# Patient Record
Sex: MALE | Race: WHITE | HISPANIC OR LATINO | Employment: FULL TIME | URBAN - METROPOLITAN AREA
[De-identification: names, ages, dates, MRNs, and addresses within clinical notes are randomized per-mention and may not be internally consistent; named-entity substitution may affect disease eponyms.]

---

## 2023-04-18 ENCOUNTER — HOSPITAL ENCOUNTER (EMERGENCY)
Facility: HOSPITAL | Age: 63
Discharge: HOME | End: 2023-04-18
Attending: EMERGENCY MEDICINE

## 2023-04-18 VITALS
HEART RATE: 66 BPM | SYSTOLIC BLOOD PRESSURE: 152 MMHG | DIASTOLIC BLOOD PRESSURE: 97 MMHG | HEIGHT: 72 IN | RESPIRATION RATE: 18 BRPM | WEIGHT: 200 LBS | TEMPERATURE: 98 F | BODY MASS INDEX: 27.09 KG/M2 | OXYGEN SATURATION: 100 %

## 2023-04-18 DIAGNOSIS — M54.50 ACUTE EXACERBATION OF CHRONIC LOW BACK PAIN: Primary | ICD-10-CM

## 2023-04-18 DIAGNOSIS — G89.29 ACUTE EXACERBATION OF CHRONIC LOW BACK PAIN: Primary | ICD-10-CM

## 2023-04-18 RX ORDER — KETOROLAC TROMETHAMINE 30 MG/ML
15 INJECTION, SOLUTION INTRAMUSCULAR; INTRAVENOUS ONCE
Status: COMPLETED | OUTPATIENT
Start: 2023-04-18 | End: 2023-04-18

## 2023-04-18 RX ORDER — CYCLOBENZAPRINE HCL 10 MG
10 TABLET ORAL 2 TIMES DAILY PRN
Qty: 20 TABLET | Refills: 0 | Status: SHIPPED | OUTPATIENT
Start: 2023-04-18

## 2023-04-18 RX ORDER — LIDOCAINE 50 MG/G
2 PATCH TOPICAL ONCE
Status: DISCONTINUED | OUTPATIENT
Start: 2023-04-18 | End: 2023-04-18 | Stop reason: HOSPADM

## 2023-04-18 RX ORDER — CYCLOBENZAPRINE HCL 10 MG
10 TABLET ORAL 2 TIMES DAILY PRN
Qty: 20 TABLET | Refills: 0 | Status: SHIPPED | OUTPATIENT
Start: 2023-04-18 | End: 2023-04-18 | Stop reason: CLARIF

## 2023-04-18 RX ADMIN — LIDOCAINE 2 PATCH: 50 PATCH TOPICAL at 16:11

## 2023-04-18 RX ADMIN — KETOROLAC TROMETHAMINE 15 MG: 30 INJECTION, SOLUTION INTRAMUSCULAR; INTRAVENOUS at 16:11

## 2023-04-18 NOTE — DISCHARGE INSTRUCTIONS
Take 650mg of acetaminophen (Tylenol) with 400 mg of ibuprofen (Advil) every 6-8 hours as needed for pain  Lidocaine patches are available over-the-counter can be found in the back pain aisle of most pharmacies  Look for 4% lidocaine in the list of the active ingredients  These patches can be placed for 12 hours  After 12 hours, discard the patch  The next patch can be placed 12 hours later  You may take Flexeril as needed  Advised it may cause drowsiness so do not drive or operate heavy machinery after use  Follow-up with orthopedics and the comprehensive spine program for further evaluation and management  Return to the ED if you develop any worsening symptoms as discussed prior to discharge

## 2023-04-18 NOTE — ED PROVIDER NOTES
History  Chief Complaint   Patient presents with   • Back Pain     Patient presents to the ER with worsening chronic back pain  This is a 65 YOM who presents to the ED for evaluation of low back pain  Patient reports this has been a chronic issue for over 20 years that will wax and wane in severity  Has tried PT and cortisone injections in the past   He denies any recent trauma or injury states that over the last few weeks his pain has been worsening  He reports bilateral lower back pain that radiates into both thighs, states these are not new symptoms  He denies any recent fevers, chills, headache, chest pain, SOB, abdominal pain, NVD, loss of bowel or bladder function, loss of sensation in either leg  He reports taking aleve at home for the pain, last took aleve yesterday evening  He reports that he is interested in receiving a cortisone injection if possible, or outpatient referral for possible cortisone injection  He is able to walk with a steady gait in the ED  None       Past Medical History:   Diagnosis Date   • Back pain at L4-L5 level        History reviewed  No pertinent surgical history  History reviewed  No pertinent family history  I have reviewed and agree with the history as documented  E-Cigarette/Vaping   • E-Cigarette Use Never User      E-Cigarette/Vaping Substances     Social History     Tobacco Use   • Smoking status: Never   • Smokeless tobacco: Never   Vaping Use   • Vaping Use: Never used   Substance Use Topics   • Alcohol use: Not Currently   • Drug use: Never       Review of Systems   Constitutional: Negative for chills and fever  Respiratory: Negative for shortness of breath  Cardiovascular: Negative for chest pain and palpitations  Gastrointestinal: Negative for abdominal pain, nausea and vomiting  Genitourinary: Negative for difficulty urinating and dysuria  Musculoskeletal: Positive for back pain   Negative for gait problem, neck pain and neck stiffness  Neurological: Negative for dizziness, weakness, light-headedness and headaches  Physical Exam  Physical Exam  Vitals and nursing note reviewed  Constitutional:       General: He is not in acute distress  Appearance: He is well-developed  HENT:      Head: Normocephalic and atraumatic  Eyes:      Conjunctiva/sclera: Conjunctivae normal    Cardiovascular:      Rate and Rhythm: Normal rate and regular rhythm  Heart sounds: No murmur heard  Pulmonary:      Effort: Pulmonary effort is normal  No respiratory distress  Breath sounds: Normal breath sounds  Abdominal:      Palpations: Abdomen is soft  Tenderness: There is no abdominal tenderness  Musculoskeletal:         General: No swelling  Cervical back: Neck supple  No swelling, deformity or tenderness  Normal range of motion  Thoracic back: No swelling, deformity or tenderness  Normal range of motion  Lumbar back: No swelling, deformity or tenderness  Comments: Examination of patient's back shows no signs of cervical, thoracic, lumbar midline tenderness or step-off deformities  Patient's lower back is nontender on palpation  Patient's leg strength 5/5 and equal bilaterally with normal ROM throughout legs  Proximal and distal sensation intact in lower extremities  Patient able to walk with steady gait in ED  Skin:     General: Skin is warm and dry  Capillary Refill: Capillary refill takes less than 2 seconds  Neurological:      General: No focal deficit present  Mental Status: He is alert and oriented to person, place, and time     Psychiatric:         Mood and Affect: Mood normal          Vital Signs  ED Triage Vitals [04/18/23 1516]   Temperature Pulse Respirations Blood Pressure SpO2   98 °F (36 7 °C) 66 18 152/97 100 %      Temp Source Heart Rate Source Patient Position - Orthostatic VS BP Location FiO2 (%)   Oral -- Sitting Left arm --      Pain Score       10 - Worst Possible Pain Vitals:    04/18/23 1516   BP: 152/97   Pulse: 66   Patient Position - Orthostatic VS: Sitting         Visual Acuity      ED Medications  Medications   lidocaine (LIDODERM) 5 % patch 2 patch (2 patches Topical Medication Applied 4/18/23 1611)   ketorolac (TORADOL) injection 15 mg (15 mg Intramuscular Given 4/18/23 1611)       Diagnostic Studies  Results Reviewed     None                 No orders to display              Procedures  Procedures         ED Course                               SBIRT 20yo+    Flowsheet Row Most Recent Value   Initial Alcohol Screen: US AUDIT-C     1  How often do you have a drink containing alcohol? 0 Filed at: 04/18/2023 1609   2  How many drinks containing alcohol do you have on a typical day you are drinking? 0 Filed at: 04/18/2023 1609   3a  Male UNDER 65: How often do you have five or more drinks on one occasion? 0 Filed at: 04/18/2023 1609   3b  FEMALE Any Age, or MALE 65+: How often do you have 4 or more drinks on one occassion? 0 Filed at: 04/18/2023 1609   Audit-C Score 0 Filed at: 04/18/2023 1609   MAYRA: How many times in the past year have you    Used an illegal drug or used a prescription medication for non-medical reasons? Never Filed at: 04/18/2023 1609                    Medical Decision Making  65 YOM with chronic back pain for greater than 20 years presents to the ED for evaluation of lower back pain radiating into both thighs, reports these are not new symptoms  Denies fevers at home, IV drug use, loss of bowel or bladder function, loss of sensation in either leg  Able to walk with steady gait in ED  Given IM toradol, lidocaine patches in ED  Flexeril prescription sent to pharmacy  Given ambulatory referral to comprehensive spine program for further evaluation management  Patient reports that he was hoping to receive a cortisone injection in his lower back and is asking for information for orthopedics    Also given local referral information for orthopedics in discharge paperwork  Discussed ED return precautions with patient  Patient verbalized understanding and agreement with plan  Risk  Prescription drug management  Disposition  Final diagnoses:   Acute exacerbation of chronic low back pain     Time reflects when diagnosis was documented in both MDM as applicable and the Disposition within this note     Time User Action Codes Description Comment    4/18/2023  3:52 PM Karalee Gravel Add [M54 50] Low back pain     4/18/2023  3:53 PM Warren Burrs [M54 42,  M54 41] Low back pain with bilateral sciatica     4/18/2023  3:53 PM Jamieelylashonda Laws [X60 59,  M54 41] Low back pain with bilateral sciatica     4/18/2023  3:53 PM Karalee Gravel Remove [M54 50] Low back pain     4/18/2023  3:53 PM Jennifer Gramajoman [Y36 40,  M54 41] Low back pain with bilateral sciatica     4/18/2023  3:53 PM Karalee Gravel Add [M54 50,  G89 29] Acute exacerbation of chronic low back pain       ED Disposition     ED Disposition   Discharge    Condition   Stable    Date/Time   Tue Apr 18, 2023  3:56 PM    Comment   Creed Ghee discharge to home/self care  Follow-up Information     Follow up With Specialties Details Why Contact Info Additional [phone] [facility] Orthopedic Surgery Schedule an appointment as soon as possible for a visit  For re-check [phone] [facility] [phone]  [address] [place] [address] Specialists [facility], [address] [place] [address]          Patient's Medications   Discharge Prescriptions    CYCLOBENZAPRINE (FLEXERIL) 10 MG TABLET    Take 1 tablet (10 mg total) by mouth 2 (two) times a day as needed for muscle spasms       Start Date: 4/18/2023 End Date: --       Order Dose: 10 mg       Quantity: 20 tablet    Refills: 0       No discharge procedures on file      PDMP Review     None          ED Provider  Electronically Signed by           Yenifer Enciso PA-C  04/18/23 1163

## 2023-04-29 ENCOUNTER — HOSPITAL ENCOUNTER (EMERGENCY)
Facility: HOSPITAL | Age: 63
Discharge: HOME | End: 2023-04-29
Attending: EMERGENCY MEDICINE

## 2023-04-29 ENCOUNTER — APPOINTMENT (EMERGENCY)
Dept: RADIOLOGY | Facility: HOSPITAL | Age: 63
End: 2023-04-29

## 2023-04-29 ENCOUNTER — APPOINTMENT (EMERGENCY)
Dept: CT IMAGING | Facility: HOSPITAL | Age: 63
End: 2023-04-29

## 2023-04-29 VITALS
DIASTOLIC BLOOD PRESSURE: 80 MMHG | OXYGEN SATURATION: 97 % | TEMPERATURE: 97.8 F | SYSTOLIC BLOOD PRESSURE: 114 MMHG | HEART RATE: 83 BPM | RESPIRATION RATE: 16 BRPM

## 2023-04-29 DIAGNOSIS — T88.7XXA MEDICATION SIDE EFFECT: ICD-10-CM

## 2023-04-29 DIAGNOSIS — M54.9 BACK PAIN: ICD-10-CM

## 2023-04-29 DIAGNOSIS — R42 LIGHTHEADEDNESS: Primary | ICD-10-CM

## 2023-04-29 LAB
ALBUMIN SERPL BCP-MCNC: 4.3 G/DL (ref 3.5–5)
ALP SERPL-CCNC: 53 U/L (ref 34–104)
ALT SERPL W P-5'-P-CCNC: 39 U/L (ref 7–52)
ANION GAP SERPL CALCULATED.3IONS-SCNC: 5 MMOL/L (ref 4–13)
AST SERPL W P-5'-P-CCNC: 25 U/L (ref 13–39)
ATRIAL RATE: 95 BPM
BASOPHILS # BLD AUTO: 0.04 THOUSANDS/ÂΜL (ref 0–0.1)
BASOPHILS NFR BLD AUTO: 1 % (ref 0–1)
BILIRUB SERPL-MCNC: 1.99 MG/DL (ref 0.2–1)
BUN SERPL-MCNC: 17 MG/DL (ref 5–25)
CALCIUM SERPL-MCNC: 9.2 MG/DL (ref 8.4–10.2)
CARDIAC TROPONIN I PNL SERPL HS: 2 NG/L
CHLORIDE SERPL-SCNC: 102 MMOL/L (ref 96–108)
CO2 SERPL-SCNC: 29 MMOL/L (ref 21–32)
CREAT SERPL-MCNC: 0.87 MG/DL (ref 0.6–1.3)
EOSINOPHIL # BLD AUTO: 0.29 THOUSAND/ÂΜL (ref 0–0.61)
EOSINOPHIL NFR BLD AUTO: 4 % (ref 0–6)
ERYTHROCYTE [DISTWIDTH] IN BLOOD BY AUTOMATED COUNT: 12.7 % (ref 11.6–15.1)
GFR SERPL CREATININE-BSD FRML MDRD: 93 ML/MIN/1.73SQ M
GLUCOSE SERPL-MCNC: 93 MG/DL (ref 65–140)
HCT VFR BLD AUTO: 50 % (ref 36.5–49.3)
HGB BLD-MCNC: 15.8 G/DL (ref 12–17)
IMM GRANULOCYTES # BLD AUTO: 0.01 THOUSAND/UL (ref 0–0.2)
IMM GRANULOCYTES NFR BLD AUTO: 0 % (ref 0–2)
LYMPHOCYTES # BLD AUTO: 1.98 THOUSANDS/ÂΜL (ref 0.6–4.47)
LYMPHOCYTES NFR BLD AUTO: 27 % (ref 14–44)
MCH RBC QN AUTO: 30.5 PG (ref 26.8–34.3)
MCHC RBC AUTO-ENTMCNC: 31.6 G/DL (ref 31.4–37.4)
MCV RBC AUTO: 97 FL (ref 82–98)
MONOCYTES # BLD AUTO: 0.53 THOUSAND/ÂΜL (ref 0.17–1.22)
MONOCYTES NFR BLD AUTO: 7 % (ref 4–12)
NEUTROPHILS # BLD AUTO: 4.49 THOUSANDS/ÂΜL (ref 1.85–7.62)
NEUTS SEG NFR BLD AUTO: 61 % (ref 43–75)
NRBC BLD AUTO-RTO: 0 /100 WBCS
P AXIS: 66 DEGREES
PLATELET # BLD AUTO: 165 THOUSANDS/UL (ref 149–390)
PMV BLD AUTO: 10.8 FL (ref 8.9–12.7)
POTASSIUM SERPL-SCNC: 4.9 MMOL/L (ref 3.5–5.3)
PR INTERVAL: 134 MS
PROT SERPL-MCNC: 7.3 G/DL (ref 6.4–8.4)
QRS AXIS: 59 DEGREES
QRSD INTERVAL: 88 MS
QT INTERVAL: 334 MS
QTC INTERVAL: 419 MS
RBC # BLD AUTO: 5.18 MILLION/UL (ref 3.88–5.62)
SODIUM SERPL-SCNC: 136 MMOL/L (ref 135–147)
T WAVE AXIS: 57 DEGREES
VENTRICULAR RATE: 95 BPM
WBC # BLD AUTO: 7.34 THOUSAND/UL (ref 4.31–10.16)

## 2023-04-29 RX ORDER — SODIUM CHLORIDE 9 MG/ML
3 INJECTION INTRAVENOUS
Status: DISCONTINUED | OUTPATIENT
Start: 2023-04-29 | End: 2023-04-29 | Stop reason: HOSPADM

## 2023-04-29 RX ORDER — IBUPROFEN 800 MG/1
800 TABLET ORAL 3 TIMES DAILY
Qty: 9 TABLET | Refills: 0 | Status: SHIPPED | OUTPATIENT
Start: 2023-04-29 | End: 2023-05-02

## 2023-04-29 RX ADMIN — SODIUM CHLORIDE 1000 ML: 0.9 INJECTION, SOLUTION INTRAVENOUS at 12:47

## 2023-04-29 NOTE — DISCHARGE INSTRUCTIONS
-Please take gabapentin 300 mg once today and once tomorrow  Afterwards, please stop the gabapentin  I believe that your lightheadedness is likely secondary to a high gabapentin dose  Please follow-up with your pain management doctor for further work-up and management    You may use ibuprofen 8 or milligrams and Tylenol 1000 mg 3 times a day for the next 3 days to help with pain along with Flexeril twice a day as needed for muscle spasms

## 2023-04-29 NOTE — ED NOTES
Pt ambulates in hallway per MD request, no complaints, denies dizziness  MD aware        Clyde Soriano, RN  04/29/23 0515

## 2023-05-04 NOTE — ED PROVIDER NOTES
"History  Chief Complaint   Patient presents with    Dizziness     \"I FEEL tingling in both my arms and dizziness and like im going to pass out so I want you to tell me I have low bp\" denies any CP, SOB \"last week I was treated for pinched nerve in my back\" started a little yesterday taking flexeril TID, gabapentin TID and medrol pack     Patient is a 29-year-old male who presents for evaluation of lightheadedness  He has chronic back pain for which she was seen 11 days ago  He was started on Flexeril and went to a pain management doctor that also started him on a Medrol Dosepak and gabapentin  He is on gabapentin 4 mg 3 times daily at this point  He says since taking the gabapentin he feels like he has some orthostasis/lightheadedness upon standing  He has not actually lost consciousness  He also does feel some mild fatigue  He denies weakness numbness at this time  Denies any headache nausea vomiting chest pain or dyspnea  No abdominal pain  Lumbar back pain is chronic moderate  Worsened by certain positions and by movement  He denies red flags including bowel bladder incontinence saddle anesthesia weakness or numbness in the legs  Prior to Admission Medications   Prescriptions Last Dose Informant Patient Reported? Taking? cyclobenzaprine (FLEXERIL) 10 mg tablet   No No   Sig: Take 1 tablet (10 mg total) by mouth 2 (two) times a day as needed for muscle spasms      Facility-Administered Medications: None       Past Medical History:   Diagnosis Date    Back pain at L4-L5 level        History reviewed  No pertinent surgical history  History reviewed  No pertinent family history  I have reviewed and agree with the history as documented      E-Cigarette/Vaping    E-Cigarette Use Never User      E-Cigarette/Vaping Substances     Social History     Tobacco Use    Smoking status: Never    Smokeless tobacco: Never   Vaping Use    Vaping Use: Never used   Substance Use Topics    Alcohol use: " Not Currently    Drug use: Never       Review of Systems   Constitutional: Positive for fatigue  Negative for fever  HENT: Negative for sore throat  Eyes: Negative for photophobia  Respiratory: Negative for shortness of breath  Cardiovascular: Negative for chest pain  Gastrointestinal: Negative for abdominal pain  Genitourinary: Negative for dysuria  Musculoskeletal: Negative for back pain  Skin: Negative for rash  Neurological: Positive for light-headedness  Hematological: Negative for adenopathy  Psychiatric/Behavioral: Negative for agitation  All other systems reviewed and are negative  Physical Exam  Physical Exam  Vitals reviewed  Constitutional:       General: He is not in acute distress  Appearance: He is well-developed  HENT:      Head: Normocephalic  Eyes:      Pupils: Pupils are equal, round, and reactive to light  Cardiovascular:      Rate and Rhythm: Normal rate and regular rhythm  Heart sounds: Normal heart sounds  No murmur heard  No friction rub  No gallop  Pulmonary:      Effort: Pulmonary effort is normal       Breath sounds: Normal breath sounds  Abdominal:      General: Bowel sounds are normal  There is no distension  Palpations: Abdomen is soft  Tenderness: There is no abdominal tenderness  There is no guarding  Musculoskeletal:         General: Normal range of motion  Cervical back: Normal range of motion and neck supple  Comments: Reproducible paralumbar tenderness   Skin:     Capillary Refill: Capillary refill takes less than 2 seconds  Neurological:      Mental Status: He is alert and oriented to person, place, and time  Cranial Nerves: No cranial nerve deficit  Sensory: No sensory deficit  Motor: No abnormal muscle tone  Psychiatric:         Behavior: Behavior normal          Thought Content:  Thought content normal          Judgment: Judgment normal          Vital Signs  ED Triage Vitals [04/29/23 1210]   Temperature Pulse Respirations Blood Pressure SpO2   97 8 °F (36 6 °C) 85 18 (!) 151/104 99 %      Temp Source Heart Rate Source Patient Position - Orthostatic VS BP Location FiO2 (%)   Temporal Monitor Sitting Right arm --      Pain Score       --           Vitals:    04/29/23 1210 04/29/23 1300 04/29/23 1400   BP: (!) 151/104 116/81 114/80   Pulse: 85 92 83   Patient Position - Orthostatic VS: Sitting Lying Lying         Visual Acuity      ED Medications  Medications   sodium chloride 0 9 % bolus 1,000 mL (0 mL Intravenous Stopped 4/29/23 1425)       Diagnostic Studies  Results Reviewed     Procedure Component Value Units Date/Time    HS Troponin 0hr (reflex protocol) [[id number]]  (Normal) Collected: 04/29/23 1233    Lab Status: Final result Specimen: Blood from Arm, Right Updated: 04/29/23 1314     hs TnI 0hr 2 ng/L     Comprehensive metabolic panel [[id number]]  (Abnormal) Collected: 04/29/23 1233    Lab Status: Final result Specimen: Blood from Arm, Right Updated: 04/29/23 1312     Sodium 136 mmol/L      Potassium 4 9 mmol/L      Chloride 102 mmol/L      CO2 29 mmol/L      ANION GAP 5 mmol/L      BUN 17 mg/dL      Creatinine 0 87 mg/dL      Glucose 93 mg/dL      Calcium 9 2 mg/dL      AST 25 U/L      ALT 39 U/L      Alkaline Phosphatase 53 U/L      Total Protein 7 3 g/dL      Albumin 4 3 g/dL      Total Bilirubin 1 99 mg/dL      eGFR 93 ml/min/1 73sq m     Narrative:      Christopher guidelines for Chronic Kidney Disease (CKD):     Stage 1 with normal or high GFR (GFR > 90 mL/min/1 73 square meters)    Stage 2 Mild CKD (GFR = 60-89 mL/min/1 73 square meters)    Stage 3A Moderate CKD (GFR = 45-59 mL/min/1 73 square meters)    Stage 3B Moderate CKD (GFR = 30-44 mL/min/1 73 square meters)    Stage 4 Severe CKD (GFR = 15-29 mL/min/1 73 square meters)    Stage 5 End Stage CKD (GFR <15 mL/min/1 73 square meters)  Note: GFR calculation is accurate only with a steady state creatinine    CBC and differential [[id number]]  (Abnormal) Collected: 04/29/23 1233    Lab Status: Final result Specimen: Blood from Arm, Right Updated: 04/29/23 1239     WBC 7 34 Thousand/uL      RBC 5 18 Million/uL      Hemoglobin 15 8 g/dL      Hematocrit 50 0 %      MCV 97 fL      MCH 30 5 pg      MCHC 31 6 g/dL      RDW 12 7 %      MPV 10 8 fL      Platelets 099 Thousands/uL      nRBC 0 /100 WBCs      Neutrophils Relative 61 %      Immat GRANS % 0 %      Lymphocytes Relative 27 %      Monocytes Relative 7 %      Eosinophils Relative 4 %      Basophils Relative 1 %      Neutrophils Absolute 4 49 Thousands/µL      Immature Grans Absolute 0 01 Thousand/uL      Lymphocytes Absolute 1 98 Thousands/µL      Monocytes Absolute 0 53 Thousand/µL      Eosinophils Absolute 0 29 Thousand/µL      Basophils Absolute 0 04 Thousands/µL                  X-ray chest 1 view portable   ED Interpretation by Daisy Henrdicks MD (04/29 1317)   Wet read: No acute cardiopulmonary process noted      Final Result by Shaye Castanon MD (04/29 1322)   No acute cardiopulmonary findings  Workstation performed: [id number]         CT spine lumbar without contrast   Final Result by Shaye Castanon MD (04/29 1321)      No acute osseous pathology  Mild degenerative disc disease and facet osteoarthritis detailed above  No evidence of large disc herniation or high-grade stenosis           Workstation performed: [id number]                    Procedures  ECG 12 Lead Documentation Only    Date/Time: 5/3/2023 8:48 PM  Performed by: Daisy Hendricks MD  Authorized by: Daisy Hendricks MD     ECG reviewed by me, the ED Provider: yes    Patient location:  ED  Previous ECG:     Previous ECG:  Unavailable    Comparison to cardiac monitor: Yes    Interpretation:     Interpretation: normal    Rate:     ECG rate assessment: normal    Rhythm:     Rhythm: sinus rhythm    Ectopy:     Ectopy: none    QRS:     QRS axis:  Normal    QRS intervals:  Normal  Conduction:     Conduction: normal    ST segments:     ST segments:  Normal  T waves:     T waves: normal               ED Course                               SBIRT 22yo+    Flowsheet Row Most Recent Value   Initial Alcohol Screen: US AUDIT-C     1  How often do you have a drink containing alcohol? 2 Filed at: 04/29/2023 1250   2  How many drinks containing alcohol do you have on a typical day you are drinking? 1 Filed at: 04/29/2023 1250   3a  Male UNDER 65: How often do you have five or more drinks on one occasion? 1 Filed at: 04/29/2023 1250   3b  FEMALE Any Age, or MALE 65+: How often do you have 4 or more drinks on one occassion? 0 Filed at: 04/29/2023 1250   Audit-C Score 4 Filed at: 04/29/2023 1250   MAYRA: How many times in the past year have you    Used an illegal drug or used a prescription medication for non-medical reasons? Never Filed at: 04/29/2023 1250                    Medical Decision Making  Patient is a 18-year-old male that presents for evaluation of lightheadedness and fatigue/weakness  Patient with negative work-up here  Patient put on a fairly high dose of gabapentin which I feel like is likely causing his symptoms  He is also taking Flexeril  Advised him to cut down gabapentin dose to once daily for the next 2 days and then stop it  Advised to follow-up with pain management after that  Strict return precautions discussed  Amount and/or Complexity of Data Reviewed  Labs: ordered  Radiology: ordered and independent interpretation performed  Risk  Prescription drug management  Disposition  Final diagnoses:   Lightheadedness   Medication side effect   Back pain     Time reflects when diagnosis was documented in both MDM as applicable and the Disposition within this note     Time User Action Codes Description Comment    4/29/2023  1:58 PM Jhonny Nathen Add [R42] Lightheadedness     4/29/2023  1:59 PM Lori, [address] [facility]  7XXA] Medication side effect     4/29/2023  2:21 PM Matt Turkey Add [M54 9] Back pain       ED Disposition     ED Disposition   Discharge    Condition   Stable    Date/Time   Sat Apr 29, 2023 1358    Bongfunmingoc discharge to home/self care  Follow-up Information     Follow up With Specialties Details Why Contact Info Additional Information     [address] Emergency Department Emergency Medicine  If symptoms worsen [address] [place],[address] [id number]  [address] [facility] Emergency Department, [address] [facility] [address]          Discharge Medication List as of 4/29/2023  2:00 PM      CONTINUE these medications which have NOT CHANGED    Details   cyclobenzaprine (FLEXERIL) 10 mg tablet Take 1 tablet (10 mg total) by mouth 2 (two) times a day as needed for muscle spasms, Starting Tue 4/18/2023, Normal             No discharge procedures on file      PDMP Review     None          ED Provider  Electronically Signed by           Delmar Willis MD  05/03/23 9653

## 2023-05-20 ENCOUNTER — APPOINTMENT (EMERGENCY)
Dept: RADIOLOGY | Facility: HOSPITAL | Age: 63
End: 2023-05-20

## 2023-05-20 ENCOUNTER — HOSPITAL ENCOUNTER (EMERGENCY)
Facility: HOSPITAL | Age: 63
Discharge: HOME | End: 2023-05-20
Attending: EMERGENCY MEDICINE | Admitting: EMERGENCY MEDICINE

## 2023-05-20 VITALS
TEMPERATURE: 98.7 F | RESPIRATION RATE: 18 BRPM | HEIGHT: 72 IN | HEART RATE: 96 BPM | DIASTOLIC BLOOD PRESSURE: 91 MMHG | OXYGEN SATURATION: 96 % | SYSTOLIC BLOOD PRESSURE: 122 MMHG | BODY MASS INDEX: 27.09 KG/M2 | WEIGHT: 200 LBS

## 2023-05-20 DIAGNOSIS — R42 EPISODIC LIGHTHEADEDNESS: Primary | ICD-10-CM

## 2023-05-20 LAB
ALBUMIN SERPL BCP-MCNC: 4.4 G/DL (ref 3.5–5)
ALP SERPL-CCNC: 55 U/L (ref 34–104)
ALT SERPL W P-5'-P-CCNC: 25 U/L (ref 7–52)
ANION GAP SERPL CALCULATED.3IONS-SCNC: 7 MMOL/L (ref 4–13)
AST SERPL W P-5'-P-CCNC: 24 U/L (ref 13–39)
ATRIAL RATE: 95 BPM
BASOPHILS # BLD AUTO: 0.03 THOUSANDS/ÂΜL (ref 0–0.1)
BASOPHILS NFR BLD AUTO: 0 % (ref 0–1)
BILIRUB SERPL-MCNC: 1.2 MG/DL (ref 0.2–1)
BUN SERPL-MCNC: 14 MG/DL (ref 5–25)
CALCIUM SERPL-MCNC: 9.2 MG/DL (ref 8.4–10.2)
CARDIAC TROPONIN I PNL SERPL HS: 3 NG/L
CHLORIDE SERPL-SCNC: 104 MMOL/L (ref 96–108)
CO2 SERPL-SCNC: 28 MMOL/L (ref 21–32)
CREAT SERPL-MCNC: 0.89 MG/DL (ref 0.6–1.3)
EOSINOPHIL # BLD AUTO: 0.14 THOUSAND/ÂΜL (ref 0–0.61)
EOSINOPHIL NFR BLD AUTO: 2 % (ref 0–6)
ERYTHROCYTE [DISTWIDTH] IN BLOOD BY AUTOMATED COUNT: 13 % (ref 11.6–15.1)
GFR SERPL CREATININE-BSD FRML MDRD: 91 ML/MIN/1.73SQ M
GLUCOSE SERPL-MCNC: 104 MG/DL (ref 65–140)
HCT VFR BLD AUTO: 43.3 % (ref 36.5–49.3)
HGB BLD-MCNC: 14.3 G/DL (ref 12–17)
IMM GRANULOCYTES # BLD AUTO: 0.01 THOUSAND/UL (ref 0–0.2)
IMM GRANULOCYTES NFR BLD AUTO: 0 % (ref 0–2)
LYMPHOCYTES # BLD AUTO: 1.23 THOUSANDS/ÂΜL (ref 0.6–4.47)
LYMPHOCYTES NFR BLD AUTO: 18 % (ref 14–44)
MAGNESIUM SERPL-MCNC: 2.2 MG/DL (ref 1.9–2.7)
MCH RBC QN AUTO: 30.6 PG (ref 26.8–34.3)
MCHC RBC AUTO-ENTMCNC: 33 G/DL (ref 31.4–37.4)
MCV RBC AUTO: 93 FL (ref 82–98)
MONOCYTES # BLD AUTO: 0.51 THOUSAND/ÂΜL (ref 0.17–1.22)
MONOCYTES NFR BLD AUTO: 8 % (ref 4–12)
NEUTROPHILS # BLD AUTO: 4.77 THOUSANDS/ÂΜL (ref 1.85–7.62)
NEUTS SEG NFR BLD AUTO: 72 % (ref 43–75)
NRBC BLD AUTO-RTO: 0 /100 WBCS
P AXIS: 65 DEGREES
PLATELET # BLD AUTO: 229 THOUSANDS/UL (ref 149–390)
PMV BLD AUTO: 10.2 FL (ref 8.9–12.7)
POTASSIUM SERPL-SCNC: 4.3 MMOL/L (ref 3.5–5.3)
PR INTERVAL: 134 MS
PROT SERPL-MCNC: 7.7 G/DL (ref 6.4–8.4)
QRS AXIS: 61 DEGREES
QRSD INTERVAL: 80 MS
QT INTERVAL: 344 MS
QTC INTERVAL: 432 MS
RBC # BLD AUTO: 4.68 MILLION/UL (ref 3.88–5.62)
SODIUM SERPL-SCNC: 139 MMOL/L (ref 135–147)
T WAVE AXIS: 57 DEGREES
VENTRICULAR RATE: 95 BPM
WBC # BLD AUTO: 6.69 THOUSAND/UL (ref 4.31–10.16)

## 2023-05-20 RX ORDER — ASPIRIN 81 MG/1
324 TABLET, CHEWABLE ORAL ONCE
Status: DISCONTINUED | OUTPATIENT
Start: 2023-05-20 | End: 2023-05-20

## 2023-05-20 RX ADMIN — SODIUM CHLORIDE 1000 ML: 0.9 INJECTION, SOLUTION INTRAVENOUS at 18:28

## 2023-05-20 NOTE — ED PROVIDER NOTES
"History  Chief Complaint   Patient presents with   • Lethargy     Pt states that an hour ago he was out to dinner and started to feel weak, and feels like he is going to pass out  Pt states that he is having a tingling feeling in his finger and hands  Pt states that a few weeks ago he had all the same sym due to chronic back pain L4-L5  Pt states he see a pain specialist for this and was seen 2 weeks ago  Is a 81-year-old male presents ED for evaluation of lightheadedness that began approximately 1 hour prior  Patient reports that he was at a [employer] and while getting food he states he started feeling lightheadedness  He reports he is able to return that was seen in the feeling continued  He reports the sensation has not subsided since being at the buffet  Patient does also report sensation of tingling in his bilateral hands and fingers  He reports a sensation of lightheadedness \"like going to pass out  \"  Denies sensation of vertigo  Per chart review, patient was seen in the same ED approximately 2 weeks ago with the same complaint  Patient does have chronic back pain and sees a pain management specialist, had been started on gabapentin  At time of his last visit patient was currently on gabapentin and it was believed that gabapentin may have been the source of his symptoms  He reports since that time he has completely stopped taking his gabapentin  He reports his symptoms feel similar to the last time he was here but without tingling in his hands and fingers  Reports chronic L4-L5 non-radiating back pain, denies loss of bowel or bladder function, IV drug use, loss of sensation or weakness in either leg  He denies any fevers, headaches, vision changes, chest pain, SOB, abdominal pain, NVD, dysuria  Prior to Admission Medications   Prescriptions Last Dose Informant Patient Reported? Taking?    cyclobenzaprine (FLEXERIL) 10 mg tablet   No No   Sig: Take 1 tablet (10 mg total) by mouth " 2 (two) times a day as needed for muscle spasms   ibuprofen (MOTRIN) 800 mg tablet   No No   Sig: Take 1 tablet (800 mg total) by mouth 3 (three) times a day for 3 days      Facility-Administered Medications: None       Past Medical History:   Diagnosis Date   • Back pain at L4-L5 level        History reviewed  No pertinent surgical history  History reviewed  No pertinent family history  I have reviewed and agree with the history as documented  E-Cigarette/Vaping   • E-Cigarette Use Never User      E-Cigarette/Vaping Substances     Social History     Tobacco Use   • Smoking status: Never   • Smokeless tobacco: Never   Vaping Use   • Vaping Use: Never used   Substance Use Topics   • Alcohol use: Yes     Comment: occasionally   • Drug use: Never       Review of Systems   Constitutional: Negative for chills and fever  HENT: Negative for rhinorrhea and sore throat  Eyes: Negative for photophobia and visual disturbance  Respiratory: Negative for cough and shortness of breath  Cardiovascular: Negative for chest pain, palpitations and leg swelling  Gastrointestinal: Negative for abdominal pain, diarrhea, nausea and vomiting  Genitourinary: Negative for difficulty urinating and dysuria  Musculoskeletal: Positive for back pain (chronic lower back pain  Denies acute changes)  Negative for neck pain and neck stiffness  Neurological: Positive for light-headedness  Negative for dizziness, syncope and headaches  Physical Exam  Physical Exam  Vitals and nursing note reviewed  Constitutional:       General: He is not in acute distress  Appearance: He is well-developed  HENT:      Head: Normocephalic and atraumatic  Eyes:      Conjunctiva/sclera: Conjunctivae normal    Cardiovascular:      Rate and Rhythm: Normal rate and regular rhythm  Heart sounds: No murmur heard  Pulmonary:      Effort: Pulmonary effort is normal  No respiratory distress  Breath sounds: Normal breath sounds  Abdominal:      Palpations: Abdomen is soft  Tenderness: There is no abdominal tenderness  Musculoskeletal:         General: No swelling  Cervical back: Neck supple  Right lower leg: No edema  Left lower leg: No edema  Comments: Patient strength 5/5 bilaterally in upper extremities   strength intact, proximal and distal sensation intact  Strength 5/5 and equal bilaterally in lower extremities with sensation intact  Skin:     General: Skin is warm and dry  Capillary Refill: Capillary refill takes less than 2 seconds  Neurological:      General: No focal deficit present  Mental Status: He is alert and oriented to person, place, and time     Psychiatric:         Mood and Affect: Mood normal          Vital Signs  ED Triage Vitals   Temperature Pulse Respirations Blood Pressure SpO2   05/20/23 1749 05/20/23 1749 05/20/23 1749 05/20/23 1749 05/20/23 1749   98 7 °F (37 1 °C) 95 18 (!) 155/104 98 %      Temp Source Heart Rate Source Patient Position - Orthostatic VS BP Location FiO2 (%)   05/20/23 1749 05/20/23 1900 05/20/23 1900 05/20/23 1900 --   Temporal Monitor Lying Right arm       Pain Score       --                  Vitals:    05/20/23 1900 05/20/23 1903 05/20/23 1930 05/20/23 2005   BP: 134/84 134/84 141/89 122/91   Pulse: 86  89 96   Patient Position - Orthostatic VS: Lying  Lying Lying         Visual Acuity      ED Medications  Medications   sodium chloride 0 9 % bolus 1,000 mL (0 mL Intravenous Stopped 5/20/23 1928)       Diagnostic Studies  Results Reviewed     Procedure Component Value Units Date/Time    HS Troponin I 4hr [[id number]]     Lab Status: No result Specimen: Blood     CBC and differential [[id number]] Collected: 05/20/23 1925    Lab Status: Final result Specimen: Blood from Arm, Left Updated: 05/20/23 1929     WBC 6 69 Thousand/uL      RBC 4 68 Million/uL      Hemoglobin 14 3 g/dL      Hematocrit 43 3 %      MCV 93 fL      MCH 30 6 pg      MCHC 33 0 g/dL RDW 13 0 %      MPV 10 2 fL      Platelets 599 Thousands/uL      nRBC 0 /100 WBCs      Neutrophils Relative 72 %      Immat GRANS % 0 %      Lymphocytes Relative 18 %      Monocytes Relative 8 %      Eosinophils Relative 2 %      Basophils Relative 0 %      Neutrophils Absolute 4 77 Thousands/µL      Immature Grans Absolute 0 01 Thousand/uL      Lymphocytes Absolute 1 23 Thousands/µL      Monocytes Absolute 0 51 Thousand/µL      Eosinophils Absolute 0 14 Thousand/µL      Basophils Absolute 0 03 Thousands/µL     Magnesium [[id number]]  (Normal) Collected: 05/20/23 1824    Lab Status: Final result Specimen: Blood from Arm, Left Updated: 05/20/23 1908     Magnesium 2 2 mg/dL     Comprehensive metabolic panel [[id number]]  (Abnormal) Collected: 05/20/23 1824    Lab Status: Final result Specimen: Blood from Arm, Left Updated: 05/20/23 1855     Sodium 139 mmol/L      Potassium 4 3 mmol/L      Chloride 104 mmol/L      CO2 28 mmol/L      ANION GAP 7 mmol/L      BUN 14 mg/dL      Creatinine 0 89 mg/dL      Glucose 104 mg/dL      Calcium 9 2 mg/dL      AST 24 U/L      ALT 25 U/L      Alkaline Phosphatase 55 U/L      Total Protein 7 7 g/dL      Albumin 4 4 g/dL      Total Bilirubin 1 20 mg/dL      eGFR 91 ml/min/1 73sq m     Narrative:      [place] guidelines for Chronic Kidney Disease (CKD):   •  Stage 1 with normal or high GFR (GFR > 90 mL/min/1 73 square meters)  •  Stage 2 Mild CKD (GFR = 60-89 mL/min/1 73 square meters)  •  Stage 3A Moderate CKD (GFR = 45-59 mL/min/1 73 square meters)  •  Stage 3B Moderate CKD (GFR = 30-44 mL/min/1 73 square meters)  •  Stage 4 Severe CKD (GFR = 15-29 mL/min/1 73 square meters)  •  Stage 5 End Stage CKD (GFR <15 mL/min/1 73 square meters)  Note: GFR calculation is accurate only with a steady state creatinine    HS Troponin 0hr (reflex protocol) [[id number]]  (Normal) Collected: 05/20/23 1824    Lab Status: Final result Specimen: Blood from Arm, Left Updated: 05/20/23 1851     hs TnI 0hr 3 ng/L     HS Troponin I 2hr [[id number]]     Lab Status: No result Specimen: Blood                  XR chest 1 view portable   ED Interpretation by Dillan John PA-C (05/20 1839)   ED Interpretation: No acute cardiopulmonary disease  Procedures  ECG 12 Lead Documentation Only    Date/Time: 5/20/2023 6:40 PM  Performed by: Dillan John PA-C  Authorized by: Dillan John PA-C     Patient location:  ED  Previous ECG:     Previous ECG:  Compared to current    Similarity:  No change  Interpretation:     Interpretation: normal    Rate:     ECG rate:  95    ECG rate assessment: normal    Rhythm:     Rhythm: sinus rhythm    Ectopy:     Ectopy: none    QRS:     QRS axis:  Normal  Conduction:     Conduction: normal    ST segments:     ST segments:  Normal  T waves:     T waves: normal               ED Course  ED Course as of 05/20/23 2017   Sat May 20, 2023   1856 TOTAL BILIRUBIN(!): 1 20  Patient denies any abdominal pain or NV at this time  Previous total bilirubin 1 99 per chart review  2003 Patient reports improvement in symptoms status post IV fluids  Patient was able to successfully ambulate to the bathroom without difficulty  Denies any significant signs of lightheadedness while ambulating to the bathroom  Patient is stable for discharge at this time  Will advise PCP follow-up  ED return precautions discussed  SBIRT 20yo+    Flowsheet Row Most Recent Value   Initial Alcohol Screen: US AUDIT-C     1  How often do you have a drink containing alcohol? 0 Filed at: 05/20/2023 1825   2  How many drinks containing alcohol do you have on a typical day you are drinking? 0 Filed at: 05/20/2023 1825   3a  Male UNDER 65: How often do you have five or more drinks on one occasion? 0 Filed at: 05/20/2023 1825   3b  FEMALE Any Age, or MALE 65+: How often do you have 4 or more drinks on one occassion?  0 Filed at: 05/20/2023 1825   Audit-C Score 0 Filed at: 05/20/2023 1825   MAYRA: How many times in the past year have you    Used an illegal drug or used a prescription medication for non-medical reasons? Never Filed at: 05/20/2023 1825                    Medical Decision Making  59-year-old male presents ED for evaluation of lightheadedness that began earlier today, denies syncope  Patient afebrile with normal vital signs in ED  CBC unremarkable, CMP unremarkable, initial troponin 3, EKG showed normal sinus rhythm without acute changes  Patient report improvement in symptoms status post IV fluids  Patient vies follow-up with PCP for further evaluation management  Strict ED return precautions discussed with patient  Patient verbalizes understanding and agreement with plan  Amount and/or Complexity of Data Reviewed  Labs: ordered  Decision-making details documented in ED Course  Radiology: ordered and independent interpretation performed  Disposition  Final diagnoses:   Episodic lightheadedness     Time reflects when diagnosis was documented in both MDM as applicable and the Disposition within this note     Time User Action Codes Description Comment    5/20/2023  8:05 PM Larry Cheung [R42] Episodic lightheadedness       ED Disposition     ED Disposition   Discharge    Condition   Stable    Date/Time   Sat May 20, 2023  8:05 PM    Himanshu discharge to home/self care  Follow-up Information     Follow up With Specialties Details Why Contact Info    Allison Gilliam, [address] [place], Nurse Practitioner Schedule an appointment as soon as possible for a visit  For re-check [address] [place] [address]  [phone]            Patient's Medications   Discharge Prescriptions    No medications on file       No discharge procedures on file      PDMP Review     None          ED Provider  Electronically Signed by           Festus De La Rosa PA-C  05/20/23 2017

## 2023-05-21 NOTE — DISCHARGE INSTRUCTIONS
Follow-up with your family doctor for further evaluation and management  Return to the ED if you develop any worsening symptoms as discussed prior to discharge